# Patient Record
Sex: MALE | Race: WHITE
[De-identification: names, ages, dates, MRNs, and addresses within clinical notes are randomized per-mention and may not be internally consistent; named-entity substitution may affect disease eponyms.]

---

## 2018-11-18 ENCOUNTER — HOSPITAL ENCOUNTER (EMERGENCY)
Dept: HOSPITAL 39 - ER | Age: 53
Discharge: HOME | End: 2018-11-18
Payer: COMMERCIAL

## 2018-11-18 VITALS — DIASTOLIC BLOOD PRESSURE: 84 MMHG | OXYGEN SATURATION: 96 % | SYSTOLIC BLOOD PRESSURE: 131 MMHG

## 2018-11-18 VITALS — TEMPERATURE: 97.6 F

## 2018-11-18 DIAGNOSIS — E78.00: ICD-10-CM

## 2018-11-18 DIAGNOSIS — Z82.49: ICD-10-CM

## 2018-11-18 DIAGNOSIS — I49.3: ICD-10-CM

## 2018-11-18 DIAGNOSIS — Z79.899: ICD-10-CM

## 2018-11-18 DIAGNOSIS — R20.0: ICD-10-CM

## 2018-11-18 DIAGNOSIS — R42: Primary | ICD-10-CM

## 2018-11-18 DIAGNOSIS — I10: ICD-10-CM

## 2018-11-18 NOTE — RAD
EXAM DESCRIPTION: Chest,2 Views



CLINICAL HISTORY:53 years Male, chest pain



Comparison: None 



FINDINGS:

No focal lung consolidation.

No pleural effusion. No pneumothorax.

Cardiac and mediastinal silhouette is unremarkable.

No acute osseous abnormality. 

Soft tissues are unremarkable. 



IMPRESSION:

No acute findings. No focal lung consolidation.



Electronically signed by:  Deangelo Castillo MD  11/18/2018 9:55 AM

CST Workstation: [id number]

## 2018-11-18 NOTE — ED.PDOC
History of Present Illness





- General


Chief Complaint: Chest Pain/MI


Stated Complaint: chest pressure,numbness left arm


Time Seen by Provider: 11/18/18 09:27


Source: patient


Exam Limitations: no limitations





- History of Present Illness


Initial Comments: 





The patient is a 53-year-old  male presenting to the emergency room 

secondary to some very mild symptoms this morning after he woke up.  He has 

experienced little bit of dizziness this morning along with a little bit of 

left arm tingling.  No chest pain, no palpitations, no shortness of breath.  No 

syncope or near-syncope.  He does not have any cardiac history.  He does have a 

positive family history of cardiac issues however.  He does have very mild 

hypertension which is controlled as well as mild hypercholesterolemia which is 

controlled.symptoms have already resolved by the time he arrived here.  He does 

have some mild untreated reflux issues.


Timing/Duration: momentarily


Severity: mild


Improving Factors: nothing


Worsening Factors: nothing


Associated Symptoms: denies symptoms


Allergies/Adverse Reactions: 


Allergies





NO KNOWN ALLERGY Allergy (Verified 11/18/18 09:26)


 








Home Medications: 


Ambulatory Orders





Aspirin [Min Low Dose] 81 mg PO DAILY 11/18/18 


Lisinopril & Hydrochlorothiazi [Lisinopril/Hctz 10-12.5 mg] 1 tab PO DAILY 11/18 /18 


Simvastatin 20 mg PO DAILY 11/18/18 











Review of Systems





- Review of Systems


Constitutional: States: no symptoms reported


EENTM: States: no symptoms reported


Respiratory: States: no symptoms reported


Cardiology: States: no symptoms reported


Gastrointestinal/Abdominal: States: no symptoms reported


Genitourinary: States: no symptoms reported


Musculoskeletal: States: no symptoms reported


Skin: States: no symptoms reported


Neurological: States: no symptoms reported


Endocrine: States: no symptoms reported


All other Systems: No Change from Baseline





Past Medical History (General)





- Patient Medical History


Hx Stroke: No


Hx Congestive Heart Failure: No


Hx Hypertension: Yes


Hx Diabetes: No


Surgical History: no surgical history





- Vaccination History


Hx Influenza Vaccination: No


Hx Pneumococcal Vaccination: No





- Social History


Hx Tobacco Use: No





Family Medical History





- Family History


  ** Father


Family History: Unknown


Living Status: Unknown





Physical Exam





- Physical Exam


General Appearance: Alert, Comfortable, No apparent distress


Eye Exam: bilateral normal


Ears, Nose, Throat: hearing grossly normal, normal ENT inspection, normal 

pharynx


Neck: full range of motion, supple


Respiratory: lungs clear, normal breath sounds, no respiratory distress, no 

accessory muscle use


Cardiovascular/Chest: normal peripheral pulses, regular rate, rhythm, no edema


Peripheral Pulses: radial,right: 2+, radial,left: 2+, dorsalis pedis,right: 2+, 

dorsalis pedis,left: 2+


Gastrointestinal/Abdominal: non tender, soft


Rectal Exam: deferred


Back Exam: normal inspection, no CVA tenderness, no vertebral tenderness


Extremity: non-tender, normal inspection, no pedal edema, normal capillary 

refill


Neurologic: CNs II-XII nml as tested, alert, normal mood/affect, oriented x 3


Skin Exam: normal color


Comments: 





 Vital Signs - 24 hr











  11/18/18 11/18/18 11/18/18





  09:23 10:35 11:00


 


Temperature 97.6 F  


 


Pulse Rate [ 88 74 67





Left Brachial]   


 


Respiratory 20 20 16





Rate   


 


Blood Pressure 161/95 127/78 136/90





[Left Arm]   


 


O2 Sat by Pulse 97 95 96





Oximetry   














  11/18/18 11/18/18 11/18/18





  12:00 13:00 14:00


 


Temperature   


 


Pulse Rate [ 90 82 82





Left Brachial]   


 


Respiratory 20 18 18





Rate   


 


Blood Pressure 136/79 122/79 117/86





[Left Arm]   


 


O2 Sat by Pulse 96 95 96





Oximetry   














  11/18/18 11/18/18





  15:00 16:00


 


Temperature  


 


Pulse Rate [ 106 H 76





Left Brachial]  


 


Respiratory 16 16





Rate  


 


Blood Pressure 128/64 118/83





[Left Arm]  


 


O2 Sat by Pulse 93 L 93 L





Oximetry  














Progress





- Progress


Progress: 





11/18/18 16:53


the patient is a 53-year-old  male presenting to the emergency room 

secondary to atypical symptoms this morning.  The patient was monitored for 

approximately 7 hours with 3 sets of cardiac enzymes showing no rise in heart 

enzymes.  Symptoms have resolved.  Source of symptoms this morning are 

uncertain.  Given his family history of coronary artery disease I would 

recommend that he get set up for an exercise tolerance test and take a baby 

aspirin daily for now.  Additionally there is a possibility  that his symptoms 

are coming from some mild gastritis or esophagitis.  He can take 1 Pepcid over-

the-counter twice daily for the next 2 or 3 weeks to help reduce any issues 

there.  ER warnings were given for worsening.  He should plan on following up 

with his primary care doctor towards the middle of this coming week.





- Results/Orders


Results/Orders: 





 Laboratory Tests











  11/18/18 11/18/18 11/18/18





  09:46 09:46 09:46


 


WBC   6.9 


 


RBC   5.14 


 


Hgb   14.9 


 


Hct   44.8 


 


MCV   87.2 


 


MCH   28.9 


 


MCHC   33.2 


 


RDW   14.2 


 


Plt Count   357 


 


MPV   7.5 


 


Absolute Neuts (auto)   3.70 


 


Absolute Lymphs (auto)   2.30 


 


Absolute Monos (auto)   0.80 


 


Absolute Eos (auto)   0.10 


 


Absolute Basos (auto)   0.00 


 


Neutrophils %   53.4 


 


Lymphocytes %   33.1 


 


Monocytes %   11.0 H 


 


Eosinophils %   1.8 


 


Basophils %   0.7 


 


PT    9.9


 


INR    0.99


 


PTT (SP)    23.8


 


D-Dimer, Quantitative    0.35


 


Sodium  138  


 


Potassium  3.7  


 


Chloride  103  


 


Carbon Dioxide  27  


 


Anion Gap  11.7 L  


 


BUN  14  


 


Creatinine  0.84  


 


BUN/Creatinine Ratio  16.7  


 


Random Glucose  113 H  


 


Serum Osmolality  277.0  


 


Calcium  9.7  


 


Magnesium  1.9  


 


Total Bilirubin  0.5  


 


AST  28  


 


ALT  33  


 


Alkaline Phosphatase  47  


 


Creatine Kinase  73  


 


CK-MB (CK-2)  1.3  


 


CK-MB (CK-2) %  Not Reportable  


 


Troponin I  < 0.02  


 


B-Natriuretic Peptide  < 5.0  


 


Serum Total Protein  6.9  


 


Albumin  4.5  


 


Globulin  2.4  


 


Albumin/Globulin Ratio  1.9  


 


TSH  2.99  














  11/18/18 11/18/18





  12:55 16:24


 


WBC  


 


RBC  


 


Hgb  


 


Hct  


 


MCV  


 


MCH  


 


MCHC  


 


RDW  


 


Plt Count  


 


MPV  


 


Absolute Neuts (auto)  


 


Absolute Lymphs (auto)  


 


Absolute Monos (auto)  


 


Absolute Eos (auto)  


 


Absolute Basos (auto)  


 


Neutrophils %  


 


Lymphocytes %  


 


Monocytes %  


 


Eosinophils %  


 


Basophils %  


 


PT  


 


INR  


 


PTT (SP)  


 


D-Dimer, Quantitative  


 


Sodium  


 


Potassium  


 


Chloride  


 


Carbon Dioxide  


 


Anion Gap  


 


BUN  


 


Creatinine  


 


BUN/Creatinine Ratio  


 


Random Glucose  


 


Serum Osmolality  


 


Calcium  


 


Magnesium  


 


Total Bilirubin  


 


AST  


 


ALT  


 


Alkaline Phosphatase  


 


Creatine Kinase  65  64


 


CK-MB (CK-2)  1.1  1.0


 


CK-MB (CK-2) %  Not Reportable  Not Reportable


 


Troponin I  < 0.02  < 0.02


 


B-Natriuretic Peptide  


 


Serum Total Protein  


 


Albumin  


 


Globulin  


 


Albumin/Globulin Ratio  


 


TSH  








EKG shows normal sinus rhythm at 83 bpm.  He does have occasional PVCs.  Normal 

axis.  Normal R-wave progression.  No ST segment or T-wave changes consistent 

with acute ischemia.





Departure





- Departure


Clinical Impression: 


 Dizziness, Numbness and tingling in left arm





Disposition: Discharge to Home or Self Care


Condition: Fair


Departure Forms:  ED Discharge - Pt. Copy, Patient Portal Self Enrollment


Diet: bland diet


Activity: increase activity as tolerated


Home Medications: 


Ambulatory Orders





Aspirin [Min Low Dose] 81 mg PO DAILY 11/18/18 


Lisinopril & Hydrochlorothiazi [Lisinopril/Hctz 10-12.5 mg] 1 tab PO DAILY 11/18 /18 


Simvastatin 20 mg PO DAILY 11/18/18 








Additional Instructions: 


the patient is a 53-year-old  male presenting to the emergency room 

secondary to atypical symptoms this morning.  The patient was monitored for 

approximately 7 hours with 3 sets of cardiac enzymes showing no rise in heart 

enzymes.  Symptoms have resolved.  Source of symptoms this morning are 

uncertain.  Given his family history of coronary artery disease I would 

recommend that he get set up for an exercise tolerance test and take a baby 

aspirin daily for now.  Additionally there is a possibility  that his symptoms 

are coming from some mild gastritis or esophagitis.  He can take 1 Pepcid over-

the-counter twice daily for the next 2 or 3 weeks to help reduce any issues 

there.  ER warnings were given for worsening.  He should plan on following up 

with his primary care doctor towards the middle of this coming week.